# Patient Record
Sex: FEMALE | Race: WHITE | URBAN - METROPOLITAN AREA
[De-identification: names, ages, dates, MRNs, and addresses within clinical notes are randomized per-mention and may not be internally consistent; named-entity substitution may affect disease eponyms.]

---

## 2021-07-22 ENCOUNTER — OFFICE VISIT (OUTPATIENT)
Dept: URBAN - METROPOLITAN AREA CLINIC 24 | Facility: CLINIC | Age: 31
End: 2021-07-22
Payer: COMMERCIAL

## 2021-07-22 DIAGNOSIS — H35.353 CYSTOID MACULAR DEGENERATION, BILATERAL: ICD-10-CM

## 2021-07-22 DIAGNOSIS — H35.52 PIGMENTARY RETINAL DYSTROPHY: Primary | ICD-10-CM

## 2021-07-22 PROCEDURE — 99204 OFFICE O/P NEW MOD 45 MIN: CPT | Performed by: OPHTHALMOLOGY

## 2021-07-22 PROCEDURE — 92134 CPTRZ OPH DX IMG PST SGM RTA: CPT | Performed by: OPHTHALMOLOGY

## 2021-07-22 RX ORDER — BRINZOLAMIDE 10 MG/ML
1 % SUSPENSION/ DROPS OPHTHALMIC
Qty: 5 | Refills: 3 | Status: ACTIVE
Start: 2021-07-22

## 2021-07-22 ASSESSMENT — INTRAOCULAR PRESSURE
OS: 14
OD: 15

## 2021-07-22 NOTE — IMPRESSION/PLAN
Impression: Pigmentary retinal dystrophy RP sine Pigmente Plan: RP -- minimal pigment may be RP Sine Pigmente -- Genetic inherited, likely AUTOS. RECESSIVE -- Reviewed family pedigree, half brother 29's no symptoms, 22y bro . Father LOV one eye trauma. No other inherited eye dz known. Son child no symptoms. Risk other family likely <1%. CME is assc'd w RPE pump loss of RP. NO injx / [place] Knack / surgery. Trial of AZOPT TID OU. Sample / Rx. 
   RTC check IOP / [facility] Giacomo Melgar. RETINA w FA 8w. Consider Dr. Michelle Bearden or [facility] Dr. Nelta Landau gene testing options.

## 2021-07-22 NOTE — IMPRESSION/PLAN
Impression: Cystoid macular degeneration CME OU Plan: CME  is assc'd w RPE pump loss of RP. See Dr. Zacarias Barillas literature. NO injx / [place] Sharif / surgery. Trial of AZOPT TID OU.   Sample / Rx.

## 2021-09-22 ENCOUNTER — OFFICE VISIT (OUTPATIENT)
Dept: URBAN - METROPOLITAN AREA CLINIC 24 | Facility: CLINIC | Age: 31
End: 2021-09-22
Payer: COMMERCIAL

## 2021-09-22 DIAGNOSIS — H54.8 LEGAL BLINDNESS, AS DEFINED IN USA: ICD-10-CM

## 2021-09-22 PROCEDURE — 92250 FUNDUS PHOTOGRAPHY W/I&R: CPT | Performed by: OPHTHALMOLOGY

## 2021-09-22 PROCEDURE — 99214 OFFICE O/P EST MOD 30 MIN: CPT | Performed by: OPHTHALMOLOGY

## 2021-09-22 PROCEDURE — 92134 CPTRZ OPH DX IMG PST SGM RTA: CPT | Performed by: OPHTHALMOLOGY

## 2021-09-22 PROCEDURE — 92235 FLUORESCEIN ANGRPH MLTIFRAME: CPT | Performed by: OPHTHALMOLOGY

## 2021-09-22 ASSESSMENT — INTRAOCULAR PRESSURE
OS: 13
OD: 11

## 2021-09-22 NOTE — IMPRESSION/PLAN
Impression: Cystoid macular degeneration CME Plan: CME  is assc'd w RPE pump loss of RP. See Dr. True Caldwell literature. NO injx / [place] Pac / surgery. Trial of AZOPT TID OU was of minimal benefit.   SUSPEND GTTS

## 2021-09-22 NOTE — IMPRESSION/PLAN
Impression: Pigmentary retinal dystrophy RP sine Pigmente Plan: RP -- minimal pigment may be RP Sine Pigmente -- Genetic inherited, likely AUTOS. RECESSIVE -- Reviewed family pedigree, half brother 29's no symptoms, 22y bro . Father LOV one eye trauma. No other inherited eye dz known. Son child no symptoms. Risk other family likely <1%. CME is assc'd w RPE pump loss of RP. NO injx / Marthenia Chock / surgery. SUSPEND AZOPT TID 
      RTC check IOP / [facility] Dr. Abdifatah Avila - scan HVF <10% - RETINA w FA 1yr  
  Consider Dr. Yazan Lerma or [facility] Dr. Jessie Foreman gene testing options.   Send notes

## 2022-09-21 ENCOUNTER — OFFICE VISIT (OUTPATIENT)
Dept: URBAN - METROPOLITAN AREA CLINIC 24 | Facility: CLINIC | Age: 32
End: 2022-09-21

## 2022-09-21 DIAGNOSIS — H35.353 CYSTOID MACULAR DEGENERATION, BILATERAL: Primary | ICD-10-CM

## 2022-09-21 DIAGNOSIS — H35.52 PIGMENTARY RETINAL DYSTROPHY: ICD-10-CM

## 2022-09-21 DIAGNOSIS — H54.8 LEGAL BLINDNESS, AS DEFINED IN USA: ICD-10-CM

## 2022-09-21 PROCEDURE — 92134 CPTRZ OPH DX IMG PST SGM RTA: CPT | Performed by: OPHTHALMOLOGY

## 2022-09-21 PROCEDURE — 92235 FLUORESCEIN ANGRPH MLTIFRAME: CPT | Performed by: OPHTHALMOLOGY

## 2022-09-21 PROCEDURE — 92250 FUNDUS PHOTOGRAPHY W/I&R: CPT | Performed by: OPHTHALMOLOGY

## 2022-09-21 PROCEDURE — 99214 OFFICE O/P EST MOD 30 MIN: CPT | Performed by: OPHTHALMOLOGY

## 2022-09-21 ASSESSMENT — INTRAOCULAR PRESSURE
OS: 8
OD: 10

## 2022-09-21 NOTE — IMPRESSION/PLAN
Impression: Pigmentary retinal dystrophy RP sine Pigmente Plan: hx: [[RP -minimal pigment may be RP Sine Pigmente - Genetic likely AUTOS. RECESSIVE -- Rvw'd fam pedigree, half bro 30's no symptoms, 22y bro . Father LOV one eye trauma. No other inherited eye dz known. Son child no symptoms. Risk other family likely <1%. CME is assc'd w RPE pump loss of RP- Failed AZOPT TID]] NO inj Sherre Rowdy / surg. NO longer on Gtts (Failed AZOPT), Monitor. RTC check IOP / Adina Graver Dr. Colletta Paul 4m - confirmed HVF <10% Legal blind RETINA w FA 1yr update - Monitor. Consider Dr. Melisa Huffman or [facility] Dr. Dhruv Farmer gene testing options.   Send notes

## 2022-09-21 NOTE — IMPRESSION/PLAN
Impression: Legal blindness, as defined in [address]: UPDATE NOTES:  LEGAL blindness d/t <10%  VISUAL FIELD OU from RP --  Pt has severe vision loss. Meets criteria for legal blindness as defined (Code Sct 25b-1c of [address] [place] [address]) <20/200 in better eye or VF no greater than 20deg. Pt merits all benefits accorded to this disability. Send notes to pt. Consider mobility training, state resources. Print record for pt.